# Patient Record
Sex: FEMALE | Race: BLACK OR AFRICAN AMERICAN | NOT HISPANIC OR LATINO | URBAN - METROPOLITAN AREA
[De-identification: names, ages, dates, MRNs, and addresses within clinical notes are randomized per-mention and may not be internally consistent; named-entity substitution may affect disease eponyms.]

---

## 2018-04-13 ENCOUNTER — EMERGENCY (EMERGENCY)
Facility: HOSPITAL | Age: 43
LOS: 0 days | Discharge: HOME | End: 2018-04-13

## 2018-04-13 VITALS
RESPIRATION RATE: 18 BRPM | HEART RATE: 81 BPM | TEMPERATURE: 100 F | SYSTOLIC BLOOD PRESSURE: 125 MMHG | DIASTOLIC BLOOD PRESSURE: 56 MMHG | OXYGEN SATURATION: 98 %

## 2018-04-13 VITALS
SYSTOLIC BLOOD PRESSURE: 131 MMHG | HEART RATE: 81 BPM | RESPIRATION RATE: 18 BRPM | TEMPERATURE: 98 F | OXYGEN SATURATION: 99 % | DIASTOLIC BLOOD PRESSURE: 72 MMHG

## 2018-04-13 DIAGNOSIS — J06.9 ACUTE UPPER RESPIRATORY INFECTION, UNSPECIFIED: ICD-10-CM

## 2018-04-13 DIAGNOSIS — R53.1 WEAKNESS: ICD-10-CM

## 2018-04-13 DIAGNOSIS — L29.2 PRURITUS VULVAE: ICD-10-CM

## 2018-04-13 DIAGNOSIS — M54.5 LOW BACK PAIN: ICD-10-CM

## 2018-04-13 RX ORDER — IBUPROFEN 200 MG
600 TABLET ORAL ONCE
Qty: 0 | Refills: 0 | Status: COMPLETED | OUTPATIENT
Start: 2018-04-13 | End: 2018-04-13

## 2018-04-13 RX ADMIN — Medication 600 MILLIGRAM(S): at 17:47

## 2018-04-13 NOTE — ED PROVIDER NOTE - CARE PLAN
Principal Discharge DX:	URI (upper respiratory infection)  Secondary Diagnosis:	Fatigue  Secondary Diagnosis:	Lower back pain

## 2018-04-13 NOTE — ED PROVIDER NOTE - NS ED ROS FT
Constitutional: + generalized weakness and fatigue. no fever, chills  ENT: + nasal congestion. no URI sxs, no throat pain, no change in voice, no excessive drooling, no ear pain/discharge, no hearing changes.   Cardiovascular: no chest pain, no sob, no syncope   Respiratory: no cough, no shortness of breath.  Gastrointestinal: no nausea, vomiting or diarrhea. no abdominal pain.    : + vaginal pruritis. no pelvic pain, no vaginal bleeding or discharge. no urinary sxs, no flank pain.  Musculoskeletal: + lower back discomfort. no fall or injury. no msk pain or injury. no neck pain, no back pain, no joint pain.   Integumentary: no rash or skin changes. no edema  Neurological: no headache, no dizziness, no visual changes, no UE/LE weakness or paresthesias. no change in mental status. no neck pain or stiffness.

## 2018-04-13 NOTE — ED ADULT NURSE NOTE - CHIEF COMPLAINT
Lab Results   Component Value Date    PSA 1.9 12/30/2016    PSA 1.23 12/04/2012    PSA 1.0 01/12/2011    PSADIAG 1.3 09/26/2016    PSADIAG 2.2 03/30/2016    PSADIAG 1.1 10/06/2014     TRUS bx of prostate on 8/22/17 showed  Volume 40 grams in size, s/p TURP    FINAL PATHOLOGIC DIAGNOSIS  1. Prostate, left apex, biopsy:  Benign prostatic tissue.  Negative for carcinoma.  2. Prostate, left middle, biopsy:  Benign prostatic tissue.  Negative for carcinoma.  3. Prostate, left base, biopsy:  Prostatic adenocarcinoma, Elena score 3+3 = 6, involving 1 of 2 cores, occupying approximately 15% of the submitted tissue and measuring 1.7 mm.  4. Prostate, right apex, biopsy:  Benign prostatic tissue.  Negative for carcinoma.  5. Prostate, right middle, biopsy:  Benign prostatic tissue.  Negative for carcinoma.  6. Prostate, right base, biopsy:  Prostatic adenocarcinoma, Elena score 3+3 = 6, involving 1 of 2 cores, occupying approximately 15% of the submitted tissue and measuring 3.3 mm.  Perineural invasion identified.    Will have a prostate cancer talk this Friday 7:45 am   The patient is a 43y Female complaining of weakness.

## 2018-04-13 NOTE — ED PROVIDER NOTE - PHYSICAL EXAMINATION
GENERAL:  well appearing, non-toxic female in no acute distress  SKIN: skin warm, pink and dry. MMM. no rash.   EYE: Normal lids, conjunctiva and sclera, PERRL, EOMI. conjunctiva is pink, no pallor.   ENT:  Airway intact. Patent oropharynx without erythema or exudate. Uvula midline. TMs clear b/l.   NECK: Neck supple. No midline cervical tenderness, meningismus, or JVD. Trachea midline  PULM: CTAB. Normal respiratory effort. No respiratory distress. No wheezes, stridor, rales or rhonchi. No retractions  CV: RRR, no M/R/G.   ABD: Soft, non-tender, non-distended. No rebound or guarding. no CVA tenderness.   : Genitourinary: external genitalia unremarkable, + scant blood in vaginal vault. normal appearance of cervix, no discharge. cervix and adnexa non-tender on bimanual examination; chaperoned by JEY Avery  MSK: FROM of all extremities.  + bilateral lower paraspinal tenderness. FROM of back. No edema, erythema, cyanosis. Distal pulses intact.  NEURO: A+Ox3, no sensory/motor deficits, CN II-XII intact. No speech slurring, pronator drift, facial asymmetry. Normal finger-to-nose  b/l. 5/5 strength throughout. Normal gait.

## 2018-04-13 NOTE — ED PROVIDER NOTE - MEDICAL DECISION MAKING DETAILS
Patient here for lower back pain, nasal congestion, fatigue, chronic vaginal pruritis. UA and Upreg negative. pelvic exam unremarkable. will refer to medicine and gyn clinic.

## 2018-04-13 NOTE — ED PROVIDER NOTE - OBJECTIVE STATEMENT
42 yo female with no significant pmh presents to the ED c/o generalized weakness and fatigue x 1 week. Associated with nasal congestion. Patient admits shes been sleeping less recently. Her children also in ED with similar sxs. 44 yo female with no significant pmh presents to the ED c/o generalized weakness and fatigue x 1 week. Associated with nasal congestion. Patient admits shes been sleeping less recently. Her children also in ED with similar viral syndrome. Patient also c/o intermittent lower back pain x several months. no trauma or injury. Denies fever, chills, chest pain, sob, abdominal pain, N/V/D, urinary sxs, flank pain, vaginal discharge. Patient current has menses. Patient also c/o vaginal pruritis. 44 yo female with no significant pmh presents to the ED c/o generalized weakness and fatigue x 1 week. Associated with nasal congestion. Patient admits shes been sleeping less recently. Patient states shes felt similar in the past - started eating healthier - less carbs and sugars which improved her symptoms. In the past week she has been eating more carbs and sugars and has been feeling more tired. Her children also in ED with similar viral syndrome. Patient also c/o intermittent lower back pain x several months. no trauma or injury. Denies fever, chills, chest pain, sob, abdominal pain, N/V/D, urinary sxs, flank pain, vaginal discharge. Patient current has menses - started one day ago, not heavy. Patient also c/o vaginal pruritis x several months - states shes been diagnosed with candida in the past. Denies fever, chills, chest pain, sob, abdominal pain, headache, dizziness, visual changes, N/V/D, urinary sxs, flank pain, sore throat, ear pain.

## 2022-11-28 NOTE — ED PROVIDER NOTE - DISCUSSED CLINICAL AND RADIOLOGICAL FINDINGS WITH, MDM
----- Message from Zulma Moreira RN sent at 11/25/2022  2:56 PM CST -----  Regarding: FW: issues with Mirena  Telephone encounter created again (#2) no answer, mailbox full  ----- Message -----  From: Zulma Moreira RN  Sent: 11/23/2022   3:03 PM CST  To: [facility] Clinical Support Staff  Subject: FW: issues with Mirena                           Called patient, no answer, mailbox full.  Patient is going to need a new referral, she hasn't been seen in GYN clinic since 2016  ----- Message -----  From: Lena Mc  Sent: 11/23/2022   2:28 PM CST  To: [facility] Clinical Support Staff  Subject: issues with Mirena                               Above pt is calling because she is having issues with her Mirena and wants to remove it. She states she thinks it has moved and she is bleeding bright red. She did go to the ER and was told to follow up with GYN. Please Advise Thanks       Called again, no answer,mailbox full......This was attempt #2   patient